# Patient Record
Sex: MALE | Race: OTHER | HISPANIC OR LATINO
[De-identification: names, ages, dates, MRNs, and addresses within clinical notes are randomized per-mention and may not be internally consistent; named-entity substitution may affect disease eponyms.]

---
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
## 2024-06-28 ENCOUNTER — EMERGENCY (EMERGENCY)
Facility: HOSPITAL | Age: 19
LOS: 0 days | Discharge: HOME | End: 2024-06-29
Attending: EMERGENCY MEDICINE
Payer: COMMERCIAL

## 2024-06-28 VITALS
TEMPERATURE: 98 F | RESPIRATION RATE: 18 BRPM | HEART RATE: 82 BPM | DIASTOLIC BLOOD PRESSURE: 70 MMHG | SYSTOLIC BLOOD PRESSURE: 160 MMHG | WEIGHT: 205.03 LBS | OXYGEN SATURATION: 100 %

## 2024-06-28 PROCEDURE — 99285 EMERGENCY DEPT VISIT HI MDM: CPT

## 2024-06-28 PROCEDURE — 73080 X-RAY EXAM OF ELBOW: CPT | Mod: 26,RT

## 2024-06-28 PROCEDURE — 72192 CT PELVIS W/O DYE: CPT | Mod: 26,MC

## 2024-06-28 PROCEDURE — 76376 3D RENDER W/INTRP POSTPROCES: CPT | Mod: 26

## 2024-06-28 PROCEDURE — 76376 3D RENDER W/INTRP POSTPROCES: CPT

## 2024-06-28 PROCEDURE — 73060 X-RAY EXAM OF HUMERUS: CPT | Mod: 26,RT

## 2024-06-28 PROCEDURE — 73564 X-RAY EXAM KNEE 4 OR MORE: CPT | Mod: LT

## 2024-06-28 PROCEDURE — 73060 X-RAY EXAM OF HUMERUS: CPT | Mod: RT

## 2024-06-28 PROCEDURE — 72192 CT PELVIS W/O DYE: CPT | Mod: MC

## 2024-06-28 PROCEDURE — 73590 X-RAY EXAM OF LOWER LEG: CPT | Mod: 26,LT

## 2024-06-28 PROCEDURE — 99284 EMERGENCY DEPT VISIT MOD MDM: CPT | Mod: 25

## 2024-06-28 PROCEDURE — 73552 X-RAY EXAM OF FEMUR 2/>: CPT | Mod: 26,LT

## 2024-06-28 PROCEDURE — 73552 X-RAY EXAM OF FEMUR 2/>: CPT | Mod: LT

## 2024-06-28 PROCEDURE — 73590 X-RAY EXAM OF LOWER LEG: CPT | Mod: LT

## 2024-06-28 PROCEDURE — 73502 X-RAY EXAM HIP UNI 2-3 VIEWS: CPT | Mod: LT

## 2024-06-28 PROCEDURE — 73502 X-RAY EXAM HIP UNI 2-3 VIEWS: CPT | Mod: 26,LT

## 2024-06-28 PROCEDURE — 73564 X-RAY EXAM KNEE 4 OR MORE: CPT | Mod: 26,LT

## 2024-06-28 PROCEDURE — 73030 X-RAY EXAM OF SHOULDER: CPT | Mod: RT

## 2024-06-28 PROCEDURE — 73080 X-RAY EXAM OF ELBOW: CPT | Mod: RT

## 2024-06-28 PROCEDURE — 73030 X-RAY EXAM OF SHOULDER: CPT | Mod: 26,RT

## 2024-06-28 RX ORDER — ACETAMINOPHEN 500 MG
650 TABLET ORAL ONCE
Refills: 0 | Status: COMPLETED | OUTPATIENT
Start: 2024-06-28 | End: 2024-06-28

## 2024-06-28 RX ADMIN — Medication 650 MILLIGRAM(S): at 16:41

## 2024-06-28 NOTE — ED ADULT TRIAGE NOTE - STATUS:
Applied Render In Strict Bullet Format?: No Continue Regimen: Doxycycline Detail Level: Zone Initiate Treatment: Ketoconzole cream , pimcrolimus

## 2024-06-28 NOTE — ED PROVIDER NOTE - CARE PROVIDER_API CALL
Harry Clarke  Orthopaedic Surgery  [address], [address]  [place] [address]  Phone: [phone]  Fax: [phone]  Follow Up Time:     Eduardo Sommers  Orthopaedic Surgery  [address], [place] [address]  Phone: [phone]  Follow Up Time:
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
## 2024-06-28 NOTE — ED PROCEDURE NOTE - PROCEDURE ADDITIONAL DETAILS
Bacitracin ointment followed by sterile gauze applied to anterior R foreleg abrasion prior to immobilizer application.

## 2024-06-28 NOTE — ED PROVIDER NOTE - CARE PROVIDERS DIRECT ADDRESSES
[email or web address]
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
## 2024-06-28 NOTE — ED PROVIDER NOTE - PROGRESS NOTE DETAILS
Informed by ED RN pt failed ambulation trial, complaining of increased pain, not only to left knee/proximal shin but to left hip. Pain and difficulty actively flexing right elbow. Pt tender to left hip and right biceps tendon region, no obvious rue swelling, ecchymosis, discoloration, deformity. Will order xrays on left hip, right elbow, humerus, and shoulder

## 2024-06-28 NOTE — ED ADULT NURSE REASSESSMENT NOTE - NS ED NURSE REASSESS COMMENT FT1
Pt received by RODRIGUE Noyola. Introduced self to pt and updated pt on plan of care. Pt verbalized understanding. Pt attempted to ambulate but states he is unable to put pressure on Left lower extremity primarily the shin. Pt co Left arm pain Pt to receive a leg brace and sling as per MD Contino. Bed at lowest height, bed by East RN umair, AOx4, denies any other pain or discomfort at this time.

## 2024-06-28 NOTE — ED PROVIDER NOTE - PATIENT PORTAL LINK FT
You can access the FollowMyHealth Patient Portal offered by [facility] by registering at the following website: [email or web address]. By joining [employer]’s FollowMyHealth portal, you will also be able to view your health information using other applications (apps) compatible with our system.
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
## 2024-06-28 NOTE — ED ADULT NURSE NOTE - NS ED NURSE DISCH DISPOSITION
Stated that he think he stepped on a broken light bulb glass or caught his foot at the end of the ladder. Laceration to bottom right foot below right great toe. Stated that he has neuropathy and did not realized that he cut his foot. ABCs intact.    Discharged

## 2024-06-28 NOTE — ED PROVIDER NOTE - PHYSICAL EXAMINATION
General: no respiratory distress, in NAD, non toxic, no sentence shortening  Eyes: PERRL EOMI  Mouth: oropharynx clear, mucous membranes   Heart: regular rate and regular rhythm, normal radial pulse  Lungs: clear, normal respirations  Gu: deferred no CVA tenderness  Abdomen: soft non tender, bowel sounds positive/hypoactive/hyperactive  Musculoskeletal: no focal swelling, DRIVER x4, Mild left knee tenderness, no obvious bony deformity, mild abrasion to left anterior shin, distal pulses intact  Neck: supple non tender, no meningismus  Skin: no tactile warmth no rash  Neuro: a&o x3, cn 2-12 intact, no focal sensory or motor deficits General: no respiratory distress, in NAD, non toxic, no sentence shortening  Eyes: PERRL EOMI  Mouth: oropharynx clear, mucous membranes   Heart: regular rate and regular rhythm, normal radial pulse  Lungs: clear, normal respirations  Gu: deferred no CVA tenderness  Abdomen: soft non tender, bowel sounds positive/hypoactive/hyperactive  Musculoskeletal: no focal swelling, DRIVER x4, Mild left knee tenderness, no obvious bony deformity  Neck: supple non tender, no meningismus  Skin: no tactile warmth no rash, mild abrasion to left anterior shin, distal pulses intact  Neuro: a&o x3, cn 2-12 intact, no focal sensory or motor deficits General: no respiratory distress, in NAD, nontoxic, no sentence shortening  Head: NC/AT  Eyes: PERRL EOMI, no raccoon's  Mouth: oropharynx clear, mucous membranes fairly moist, no Patten's, no clinical evidence of facial injury   Heart: regular rate and regular rhythm, normal radial pulse  Lungs: clear, normal respirations  Gu: deferred, no flank nor CVA tenderness  Abdomen: soft, nontender, bowel sounds positive, no seat belt sign  Musculoskeletal: no focal deformity nor swelling, DRIVER x4, Mild left knee tenderness, no obvious bony deformity, effusion, AROM decreased d/t pain.  Poor R SLR d/t R knee pain.  RLE otherwise: no focal swelling/deformity/discoloration/tenderness other than + TTP R lower leg in area of abrasion w/ associated mild soft tissue swelling, soft tissues + soft to palpation.  RLE distal normal motor/sensory exam, distal pulses intact.  Neck: supple w/o pain, nontender  Skin: no tactile warmth no rash, mild abrasion to left anterior foreleg  Neuro: a&o x3, CN 2-12 intact, no focal sensory or motor deficits, normal speech

## 2024-06-29 VITALS
RESPIRATION RATE: 18 BRPM | OXYGEN SATURATION: 100 % | SYSTOLIC BLOOD PRESSURE: 134 MMHG | TEMPERATURE: 98 F | DIASTOLIC BLOOD PRESSURE: 83 MMHG | HEART RATE: 96 BPM